# Patient Record
Sex: MALE | Race: WHITE | NOT HISPANIC OR LATINO | URBAN - METROPOLITAN AREA
[De-identification: names, ages, dates, MRNs, and addresses within clinical notes are randomized per-mention and may not be internally consistent; named-entity substitution may affect disease eponyms.]

---

## 2020-09-25 ENCOUNTER — EMERGENCY (EMERGENCY)
Facility: HOSPITAL | Age: 25
LOS: 1 days | Discharge: HOME | End: 2020-09-25
Attending: EMERGENCY MEDICINE
Payer: COMMERCIAL

## 2020-09-25 VITALS
SYSTOLIC BLOOD PRESSURE: 147 MMHG | RESPIRATION RATE: 18 BRPM | HEART RATE: 84 BPM | TEMPERATURE: 98 F | DIASTOLIC BLOOD PRESSURE: 97 MMHG | OXYGEN SATURATION: 100 %

## 2020-09-25 PROCEDURE — 99283 EMERGENCY DEPT VISIT LOW MDM: CPT | Mod: 25

## 2020-09-25 PROCEDURE — 73140 X-RAY EXAM OF FINGER(S): CPT

## 2020-09-25 PROCEDURE — 73140 X-RAY EXAM OF FINGER(S): CPT | Mod: 26,RT

## 2020-09-25 PROCEDURE — 29125 APPL SHORT ARM SPLINT STATIC: CPT | Mod: RT

## 2020-09-25 NOTE — ED PROVIDER NOTE - CARE PROVIDER_API CALL
Deni Ferrell)  Plastic Surgery  [address] [place] [address], [address]  [place] [address]  Phone: [phone]  Fax: [phone]  Follow Up Time:

## 2020-09-25 NOTE — ED PROVIDER NOTE - CARE PLAN
Principal Discharge DX:	Closed displaced fracture of neck of first metacarpal bone of right hand, initial encounter

## 2020-09-25 NOTE — ED PROVIDER NOTE - ATTENDING CONTRIBUTION TO CARE
s/p fall fell back caban while skate boarding  c/p pain in right hand  X-ray shows displaced fracture of the surgical neck of the 1st metacarpal bone with angulation. Discussed over the phone with on-call hand surgery Dr. Ferrell who recommended not reducing the fracture because it is unstable. Will place thumb spica splint and fu with outpatient.

## 2020-09-25 NOTE — ED PROVIDER NOTE - PHYSICAL EXAMINATION
Right thumb with swelling, tenderness and mild deformity to the distal aspect of the metacarpal bone. No motor or sensation deficits. Superficial abrasion to dorsal aspect of the 1st mcp. Capillary refill <2 seconds. Slightly limited range of motion of the thumb.

## 2020-09-25 NOTE — ED PROVIDER NOTE - PATIENT PORTAL LINK FT
You can access the FollowMyHealth Patient Portal offered by [facility] by registering at the following website: [email or web address]. By joining [employer]’s FollowMyHealth portal, you will also be able to view your health information using other applications (apps) compatible with our system.

## 2020-09-25 NOTE — ED PROVIDER NOTE - NSFOLLOWUPINSTRUCTIONS_ED_ALL_ED_FT
Follow up with Dr Ferrell within 5 days.  For pain you can take over the counter Ibuprofen 600 mg orally every 6 hours as needed for pain. Take medication with food.   If you experience any new or worsening symptoms or if you are concerned you can always come back to the emergency for a re-evaluation. Follow up with Dr Ferrell within 5 days.  For pain you can take over the counter Ibuprofen 600 mg orally every 6 hours as needed for pain. Take medication with food.   If you experience any new or worsening symptoms or if you are concerned you can always come back to the emergency for a re-evaluation.      Thumb Fracture    WHAT YOU NEED TO KNOW:    A thumb fracture is a break in a bone in your thumb.    DISCHARGE INSTRUCTIONS:    Return to the emergency department if:   •Your cast cracks or breaks.      •You are not able to move your fingers.      •You have severe pain in your thumb or hand.      •You have new or increased swelling in your thumb or hand.      •Your cast feels too tight.      •Your injured thumb is numb.      •Your skin under the cast or splint burns or stings.      Call your doctor or hand specialist if:   •The skin around your cast becomes red and sore.      •The skin under your cast or splint itches, and the itch does not go away.      •You have questions or concerns about your condition or care.      Medicines:   •Prescription pain medicine may be given. Ask your healthcare provider how to take this medicine safely. Some prescription pain medicines contain acetaminophen. Do not take other medicines that contain acetaminophen without talking to your healthcare provider. Too much acetaminophen may cause liver damage. Prescription pain medicine may cause constipation. Ask your healthcare provider how to prevent or treat constipation.       •Take your medicine as directed. Contact your healthcare provider if you think your medicine is not helping or if you have side effects. Tell him or her if you are allergic to any medicine. Keep a list of the medicines, vitamins, and herbs you take. Include the amounts, and when and why you take them. Bring the list or the pill bottles to follow-up visits. Carry your medicine list with you in case of an emergency.      Self-care:   •Apply ice on your thumb to help decrease swelling and pain. Ice may also help prevent tissue damage. Use an ice pack, or put crushed ice in a plastic bag. Cover it with a towel before you place it on your thumb, splint, or cast. Apply ice for 15 to 20 minutes every hour, or as directed.      •Elevate your thumb above the level of your heart as often as you can. This will help decrease swelling and pain. Prop your hand on pillows or blankets to keep your thumb elevated comfortably.             •Check the skin around your cast or splint daily for red or sore areas.      •Go to a hand therapist, if recommended. Your healthcare provider may recommend this after your cast or splint is removed. A hand therapist can help you with exercises to strengthen your hand and help restore movement.      Cast or splint care:   •Keep the cast or splint dry. Cover as directed when you need to shower.      •Do not stick objects inside to scratch an itch.      •Do not pull the padding out.      •Keep dirt, sand, and powder out.      Follow up with your doctor or hand specialist as directed: You may need x-rays of your thumb to check the position as it heals. Your cast may need to be removed after 2 to 3 weeks to check any wounds. Write down your questions so you remember to ask them during your visits. Follow up with Dr Ferrell within 5 days.  For pain you can take over the counter Ibuprofen 600 mg orally every 6 hours as needed for pain. Take medication with food.   If you experience any new or worsening symptoms or if you are concerned you can always come back to the emergency for a re-evaluation.      Thumb Fracture    WHAT YOU NEED TO KNOW:    A thumb fracture is a break in a bone in your thumb.    DISCHARGE INSTRUCTIONS:    Return to the emergency department if:   •Your cast cracks or breaks.      •You are not able to move your fingers.      •You have severe pain in your thumb or hand.      •You have new or increased swelling in your thumb or hand.      •Your cast feels too tight.      •Your injured thumb is numb.      •Your skin under the cast or splint burns or stings.      Call your doctor or hand specialist if:   •The skin around your cast becomes red and sore.      •The skin under your cast or splint itches, and the itch does not go away.      •You have questions or concerns about your condition or care.      Medicines:   •Prescription pain medicine may be given. Ask your healthcare provider how to take this medicine safely. Some prescription pain medicines contain acetaminophen. Do not take other medicines that contain acetaminophen without talking to your healthcare provider. Too much acetaminophen may cause liver damage. Prescription pain medicine may cause constipation. Ask your healthcare provider how to prevent or treat constipation.       •Take your medicine as directed. Contact your healthcare provider if you think your medicine is not helping or if you have side effects. Tell him or her if you are allergic to any medicine. Keep a list of the medicines, vitamins, and herbs you take. Include the amounts, and when and why you take them. Bring the list or the pill bottles to follow-up visits. Carry your medicine list with you in case of an emergency.      Self-care:   •Apply ice on your thumb to help decrease swelling and pain. Ice may also help prevent tissue damage. Use an ice pack, or put crushed ice in a plastic bag. Cover it with a towel before you place it on your thumb, splint, or cast. Apply ice for 15 to 20 minutes every hour, or as directed.      •Elevate your thumb above the level of your heart as often as you can. This will help decrease swelling and pain. Prop your hand on pillows or blankets to keep your thumb elevated comfortably.             •Check the skin around your cast or splint daily for red or sore areas.      •Go to a hand therapist, if recommended. Your healthcare provider may recommend this after your cast or splint is removed. A hand therapist can help you with exercises to strengthen your hand and help restore movement.      Cast or splint care:   •Keep the cast or splint dry. Cover as directed when you need to shower.      •Do not stick objects inside to scratch an itch.      •Do not pull the padding out.      •Keep dirt, sand, and powder out.      Follow up with your doctor or hand specialist as directed: You may need x-rays of your thumb to check the position as it heals. Your cast may need to be removed after 2 to 3 weeks to check any wounds. Write down your questions so you remember to ask them during your visits.        Splint Care    WHAT YOU NEED TO KNOW:    What do I need to know about splint care? Splint care is important to help protect your splint until it comes off. Some splints are made of fiberglass or plaster that will need to dry and harden. Splint care will help the splint dry and harden correctly. Even after your splint hardens, it can be damaged.     How do I care for my splint?   •Wait for your hard splint to harden completely. You may have to wait up to 3 days before you can walk on a plaster splint.      •Check your splint and the skin around it each day. Check your splint for damage, such as cracks and breaks. Check your skin for redness, increased swelling, and sores. Loosen the elastic bandage around your splint if it feels too tight.      •Keep your splint clean and dry. Keep dirt out of your splint. Before you bathe, wrap your hard splint with 2 layers of plastic. Then put a plastic bag over it. Keep the plastic bag tightly sealed. You can also ask your healthcare provider about waterproof shields. Do not put your hard splint in the water, even with a plastic bag over it. A wet splint can make your skin itchy, and may lead to infection.      •Do not put powders or deodorants inside your splint. These can dry your skin and increase itching.       •Do not try to scratch the skin inside your hard splint with sharp objects. Sharp objects can break off inside your splint or hurt your skin.       •Do not pull the padding out of your splint. The padding inside your splint protects your skin. You may develop a sore on your skin if you take out the padding.      When should I seek immediate care?   •You have increased pain.      •Your fingers or toes are numb or tingling.      •You feel burning or stinging around your injury.      •Your nails, fingers, or toes turn pale, blue, or gray, and feel cold.      •You have new or increased trouble moving your fingers or toes.      •Your swelling gets worse.      •The skin under your splint is bleeding or leaking pus.       When should I contact my healthcare provider?   •Your hard splint gets wet or is damaged.      •You have a fever.      •Your splint feels tighter.      •You have itchy, dry skin under your splint that is getting worse.      •The skin under your splint is red, or you have a new sore.      •You notice a bad smell coming from your splint.       •You have questions or concerns about your condition or care.      CARE AGREEMENT:    You have the right to help plan your care. Learn about your health condition and how it may be treated. Discuss treatment options with your healthcare providers to decide what care you want to receive. You always have the right to refuse treatment.

## 2020-09-25 NOTE — ED PROVIDER NOTE - CLINICAL SUMMARY MEDICAL DECISION MAKING FREE TEXT BOX
X-ray shows displaced fracture of the surgical neck of the 1st metacarpal bone with angulation. Discussed over the phone with on-call hand surgery Dr. Ferrell who recommended not reducing the fracture because it is unstable. Will place thumb spica splint and fu with outpatient.

## 2020-09-25 NOTE — ED PROVIDER NOTE - OBJECTIVE STATEMENT
26 y/o M pt with no significant PMHx, presents to the ED for evaluation of right hand injury s/p fall earlier today. Patient reports while riding homicidal ideations skateboard, he lost balance and fell backwards landing on the medial aspect of the right hand. Denies head injury or LOC.  Patient is now c/o localize sharp moderate thumb pain associated with swelling and abrasion. Patient denies numbness, tingling, focal weakness or any other complaints/injuries. Tetanus UTD. NKDA. 26 y/o M pt with no significant PMHx, presents to the ED for evaluation of right hand injury s/p fall earlier today. Patient reports while riding his skateboard, he lost balance and fell backwards landing on the medial aspect of the right hand. Denies head injury or LOC.  Patient is now c/o localize sharp moderate thumb pain associated with swelling and abrasion. Patient denies numbness, tingling, focal weakness or any other complaints/injuries. Tetanus UTD. NKDA.

## 2020-09-25 NOTE — ED PROVIDER NOTE - PROGRESS NOTE DETAILS
Thumb spica splint applied. Will refer to hand for follow up within 5 days. Pt is well appearing walking with steady gait, stable for discharge and follow up without fail with medical doctor. I had a detailed discussion with the patient and/or guardian regarding the historical points, exam findings, and any diagnostic results supporting the discharge diagnosis. Pt educated on care and need for follow up. Strict return instructions and red flag signs and symptoms discussed with patient. Questions answered. Pt shows understanding of discharge information and agrees to follow.

## 2020-09-30 PROCEDURE — 29125 APPL SHORT ARM SPLINT STATIC: CPT | Mod: RT

## 2020-09-30 PROCEDURE — 99284 EMERGENCY DEPT VISIT MOD MDM: CPT | Mod: 25

## 2024-10-14 NOTE — ED ADULT NURSE NOTE - BREATH SOUNDS, MLM
Endocrine Refill protocol for metformin    Protocol Criteria:  PASSED      If all below requirements are met, send a 90-day supply with 1 refill per provider protocol.     Verify appointment with Endocrinology completed in the last 6 months or scheduled in the next 3 months.  Verify A1C has been completed within the last 6 months and is below 8.5%  Verify last GFR is greater than or equal to 40 in the past 12 months    Last completed office visit:7/23/2024 Nuha Bravo MD   Next scheduled Follow up:   Future Appointments   Date Time Provider Department Center   10/22/2024  8:45 AM Nuha Bravo MD [facility] [place]       Last GFR result:    Lab Results   Component Value Date    EGFRCR 61 04/22/2024     Last A1c result: Last A1C result: 6.5% done 7/23/2024.      How Many Skin Cancers Have You Had?: more than one What Is The Reason For Today's Visit?: History of Non-Melanoma Skin Cancer Clear